# Patient Record
Sex: FEMALE | Race: WHITE
[De-identification: names, ages, dates, MRNs, and addresses within clinical notes are randomized per-mention and may not be internally consistent; named-entity substitution may affect disease eponyms.]

---

## 2017-08-18 ENCOUNTER — HOSPITAL ENCOUNTER (EMERGENCY)
Dept: HOSPITAL 25 - UCCORT | Age: 42
Discharge: HOME | End: 2017-08-18
Payer: COMMERCIAL

## 2017-08-18 VITALS — DIASTOLIC BLOOD PRESSURE: 69 MMHG | SYSTOLIC BLOOD PRESSURE: 127 MMHG

## 2017-08-18 DIAGNOSIS — Z88.1: ICD-10-CM

## 2017-08-18 DIAGNOSIS — Z88.5: ICD-10-CM

## 2017-08-18 DIAGNOSIS — M79.672: Primary | ICD-10-CM

## 2017-08-18 DIAGNOSIS — Z90.49: ICD-10-CM

## 2017-08-18 DIAGNOSIS — Z88.2: ICD-10-CM

## 2017-08-18 PROCEDURE — G0463 HOSPITAL OUTPT CLINIC VISIT: HCPCS

## 2017-08-18 PROCEDURE — 99212 OFFICE O/P EST SF 10 MIN: CPT

## 2017-08-18 NOTE — UC
Lower Extremity/Ankle HPI





- HPI Summary


HPI Summary: 





42 YEAR OLD FEMALE PRESENTS WITH COMPLAINS OF LEFT FOOT PAIN. 





- History of Current Complaint


Stated Complaint: LEFT FOOT PAIN


Time Seen by Provider: 17 19:36


Hx Obtained From: Patient


Hx Last Menstrual Period: 16


Severity Initially: Moderate


Severity Currently: Moderate


Pain Scale Used: 0-10 Numeric - 5


Aggravating Factor(s): Standing


Alleviating Factor(s): Rest, Elevation





- Allergies/Home Medications


Allergies/Adverse Reactions: 


 Allergies











Allergy/AdvReac Type Severity Reaction Status Date / Time


 


Sulfa Drugs Allergy Intermediate Hives Verified 17 19:52


 


Amoxicillin AdvReac Intermediate GI Upset Verified 17 19:52


 


Erythromycin AdvReac Intermediate GI Upset Verified 17 19:52


 


Codeine AdvReac  Shakes Verified 17 19:52














PMH/Surg Hx/FS Hx/Imm Hx





- Surgical History


Surgical History: Yes


Surgery Procedure, Year, and Place: gallbladder removed , tonsils .  D 

and C 2013 and 3/2013.  tubal ligation.   x1.  R carpel tunnel





- Family History


Known Family History: Positive: Cardiac Disease - father has had quadruple 

bypass, Hypertension, Other





- Social History


Alcohol Use: None


Substance Use Type: None


Smoking Status (MU): Never Smoked Tobacco





- Immunization History


Most Recent Influenza Vaccination: 2015





Review of Systems


Constitutional: Negative


Skin: Negative


Eyes: Negative


ENT: Negative


Respiratory: Negative


Cardiovascular: Negative


Gastrointestinal: Negative


Genitourinary: Negative


Motor: Negative


Neurovascular: Negative


Musculoskeletal: Other: - LEFT FOOT PAIN


Neurological: Negative


Psychological: Negative


All Other Systems Reviewed And Are Negative: Yes





Physical Exam


Triage Information Reviewed: Yes


Eye Exam: Normal


ENT Exam: Normal


Dental Exam: Normal


Neck exam: Normal


Neck: Positive: 1


Respiratory Exam: Normal


Cardiovascular Exam: Normal


Abdominal Exam: Normal


Musculoskeletal: Positive: Other: - LEFT FOOT PAIN


Neurological Exam: Normal


Psychological Exam: Normal


Skin Exam: Normal





Lower Extremity Course/Dx





- Differential Dx/Diagnosis


Provider Diagnoses: LEFT FOOT PAIN





Discharge





- Discharge Plan


Condition: Stable


Disposition: HOME


Prescriptions: 


Meloxicam [Mobic] 7.5 mg PO BID #30 tab


Patient Education Materials:  Foot Sprain (ED)


Referrals: 


Bubba Russell MD [Medical Doctor] - 


DIANE Zepeda PA [Primary Care Provider] - If Needed

## 2017-08-18 NOTE — RAD
INDICATION:  Atraumatic foot left pain.



TECHNIQUE: 3 views of the left foot were obtained.



FINDINGS: There is soft tissue swelling noted along the dorsal aspect of the foot. The

bones are normal alignment. No fracture is seen.  



Joint spaces appear maintained.



There are small calcaneal spurs.

  

IMPRESSION:  SOFT TISSUE SWELLING, NO FRACTURE IS SEEN.

## 2017-10-01 ENCOUNTER — HOSPITAL ENCOUNTER (EMERGENCY)
Dept: HOSPITAL 25 - UCCORT | Age: 42
Discharge: HOME | End: 2017-10-01
Payer: COMMERCIAL

## 2017-10-01 VITALS — SYSTOLIC BLOOD PRESSURE: 113 MMHG | DIASTOLIC BLOOD PRESSURE: 72 MMHG

## 2017-10-01 DIAGNOSIS — M25.561: Primary | ICD-10-CM

## 2017-10-01 PROCEDURE — 99211 OFF/OP EST MAY X REQ PHY/QHP: CPT

## 2017-10-01 PROCEDURE — G0463 HOSPITAL OUTPT CLINIC VISIT: HCPCS

## 2017-10-01 NOTE — RAD
HISTORY: Right knee pain



COMPARISONS: None



VIEWS: 4, Frontal, lateral, axial, and oblique views of the right knee



FINDINGS:



BONE DENSITY: Normal.

BONES: There is no displaced fracture. There is inferior patellar enthesophyte.

JOINTS: There is no arthropathy.    

ALIGNMENT: There is no dislocation. 

SOFT TISSUES: Unremarkable.



OTHER FINDINGS: None.



IMPRESSION: 

NO ACUTE OSSEOUS INJURY. IF SYMPTOMS PERSIST, RECOMMEND REPEAT IMAGING.

## 2017-10-01 NOTE — UC
Knee Pain HPI





- HPI Summary


HPI Summary: 





43 yo female with right knee pain x 1 week


no injury


pain worse with doing stairs ...has to take it one at a time


no swelling


no buckling


hard to get in and out of car


no locking up











- History of Current Complaint


Chief Complaint: UCLowerExtremity


Stated Complaint: RIGHT KNEE INJURY


Time Seen by Provider: 10/01/17 07:53


Hx Obtained From: Patient


Hx Last Menstrual Period: 17


Onset/Duration: Gradual Onset, Lasting Weeks - 1


Severity Initially: Moderate


Severity Currently: Moderate


Pain Intensity: 4


Pain Scale Used: 0-10 Numeric


Character: Dull, Aching


Aggravating Factor(s): Movement, Weight Bearing


Alleviating Factor(s): Rest


Associated Signs And Symptoms: Positive: Negative


Able to Bear Weight: Yes





- Allergies/Home Medications


Allergies/Adverse Reactions: 


 Allergies











Allergy/AdvReac Type Severity Reaction Status Date / Time


 


Sulfa Drugs Allergy Intermediate Hives Verified 10/01/17 07:53


 


Amoxicillin AdvReac Intermediate GI Upset Verified 10/01/17 07:53


 


Erythromycin AdvReac Intermediate GI Upset Verified 10/01/17 07:53


 


Codeine AdvReac  Shakes Verified 10/01/17 07:53














PMH/Surg Hx/FS Hx/Imm Hx


Previously Healthy: Yes





- Surgical History


Surgical History: Yes


Surgery Procedure, Year, and Place: gallbladder removed , tonsils .  D 

and C 2013 and 3/2013.  tubal ligation.   x1.  R carpel tunnel





- Family History


Known Family History: Positive: Cardiac Disease - father has had quadruple 

bypass, Hypertension, Other - RA





- Social History


Alcohol Use: None


Substance Use Type: None


Smoking Status (MU): Never Smoked Tobacco





- Immunization History


Most Recent Influenza Vaccination: 2017





Review of Systems


Constitutional: Negative


Skin: Negative


Eyes: Negative


ENT: Negative


Respiratory: Negative


Cardiovascular: Negative


Gastrointestinal: Negative


Genitourinary: Negative


Motor: Negative


Neurovascular: Negative


Musculoskeletal: Arthralgia


Neurological: Negative


Psychological: Negative


Is Patient Immunocompromised?: No


All Other Systems Reviewed And Are Negative: Yes





Physical Exam


Triage Information Reviewed: Yes


Appearance: Well-Appearing, No Pain Distress, Well-Nourished


Vital Signs: 


 Initial Vital Signs











Temp  98.5 F   10/01/17 07:49


 


Pulse  77   10/01/17 07:49


 


Resp  14   10/01/17 07:49


 


BP  113/72   10/01/17 07:49


 


Pulse Ox  100   10/01/17 07:49











Vital Signs Reviewed: Yes


Eyes: Positive: Conjunctiva Clear


ENT: Positive: Hearing grossly normal, Pharynx normal.  Negative: Nasal 

congestion, Nasal drainage, Trismus, Muffled/hoarse voice


Neck: Positive: Supple, Nontender, No Lymphadenopathy


Respiratory: Positive: Lungs clear, Normal breath sounds, No respiratory 

distress, No accessory muscle use


Cardiovascular: Positive: RRR, No Murmur


Musculoskeletal: Positive: ROM Intact, No Edema, Other: - slight right patellar 

grind/slight medial and lateral joint line pain no effusion right knee


Neurological: Positive: Alert


Psychological Exam: Normal


Skin Exam: Normal





Knee Pain Course/Dx





- Differential Dx/Diagnosis


Provider Diagnoses: right knee pain.  ? tendonitis versus patello femoral pain 

syndrome vs other





Discharge





- Discharge Plan


Condition: Stable


Disposition: HOME


Patient Education Materials:  Knee Pain (ED)


Referrals: 


DIANE Zepeda PA [Primary Care Provider] - 4 Days


Additional Instructions: 


you xr showed a bone spur coming off the lower pole of you knee cap





? tendonitis vs patellofemoral pain syndrome vs other





take ibuprofen or mobic





ice twice daily








follow up with your provider this week

## 2018-04-06 ENCOUNTER — HOSPITAL ENCOUNTER (EMERGENCY)
Dept: HOSPITAL 25 - UCCORT | Age: 43
Discharge: HOME | End: 2018-04-06
Payer: COMMERCIAL

## 2018-04-06 VITALS — DIASTOLIC BLOOD PRESSURE: 78 MMHG | SYSTOLIC BLOOD PRESSURE: 125 MMHG

## 2018-04-06 DIAGNOSIS — J02.0: Primary | ICD-10-CM

## 2018-04-06 PROCEDURE — 99212 OFFICE O/P EST SF 10 MIN: CPT

## 2018-04-06 PROCEDURE — G0463 HOSPITAL OUTPT CLINIC VISIT: HCPCS

## 2018-04-06 PROCEDURE — 87651 STREP A DNA AMP PROBE: CPT

## 2018-04-06 NOTE — UC
Throat Pain/Nasal Lj HPI





- History of Current Complaint


Chief Complaint: UCGeneralIllness


Stated Complaint: SORE THROAT


Time Seen by Provider: 18 10:02


Hx Last Menstrual Period: 18


Pain Intensity: 8





- Allergies/Home Medications


Allergies/Adverse Reactions: 


 Allergies











Allergy/AdvReac Type Severity Reaction Status Date / Time


 


amoxicillin Allergy  GI Upset Verified 18 09:43


 


codeine Allergy  Shakes Verified 18 09:43


 


erythromycin base Allergy  GI Upset Verified 18 09:43


 


Sulfa (Sulfonamide Allergy  Hives Verified 18 09:43





Antibiotics)     














PMH/Surg Hx/FS Hx/Imm Hx





- Surgical History


Surgical History: Yes


Surgery Procedure, Year, and Place: gallbladder removed , tonsils .  D 

and C 2013 and 3/2013.  tubal ligation.   x1.  R carpel tunnel





- Family History


Known Family History: Positive: Cardiac Disease - father has had quadruple 

bypass, Hypertension, Other - RA





- Social History


Alcohol Use: None


Substance Use Type: None


Smoking Status (MU): Never Smoked Tobacco





- Immunization History


Most Recent Influenza Vaccination: 2017





Physical Exam


Vital Signs: 


 Initial Vital Signs











Temp  98.6 F   18 09:41


 


Pulse  82   18 09:41


 


Resp  16   18 09:41


 


BP  125/78   18 09:41


 


Pulse Ox  100   18 09:41














Discharge





- Sign-Out/Discharge


Documenting (check all that apply): Discharge





- Discharge Plan


Referrals: 


DIANE Zepeda PA [Primary Care Provider] -

## 2018-04-06 NOTE — UC
Throat Pain/Nasal Lj HPI





- HPI Summary


HPI Summary: 





Pt works at day care


Pt with sore throat x 2 days. no fever, chills  


No n/v/d


no drooling


painful swallowing


no analgesia taken


no rash





Pt's medications reviewed this visit





- History of Current Complaint


Chief Complaint: UCGeneralIllness


Stated Complaint: SORE THROAT


Time Seen by Provider: 18 10:02


Hx Obtained From: Patient


Hx Last Menstrual Period: 18


Pregnant?: No


Onset/Duration: Gradual Onset


Pain Intensity: 8


Pain Scale Used: 0-10 Numeric





- Allergies/Home Medications


Allergies/Adverse Reactions: 


 Allergies











Allergy/AdvReac Type Severity Reaction Status Date / Time


 


amoxicillin Allergy  GI Upset Verified 18 09:43


 


codeine Allergy  Shakes Verified 18 09:43


 


erythromycin base Allergy  GI Upset Verified 18 09:43


 


Sulfa (Sulfonamide Allergy  Hives Verified 18 09:43





Antibiotics)     














PMH/Surg Hx/FS Hx/Imm Hx


Previously Healthy: Yes





- Surgical History


Surgical History: Yes


Surgery Procedure, Year, and Place: gallbladder removed , tonsils .  D 

and C 2013 and 3/2013.  tubal ligation.   x1.  R carpel tunnel





- Family History


Known Family History: Positive: Cardiac Disease - father has had quadruple 

bypass, Hypertension, Other - RA





- Social History


Occupation: Employed Full-time


Lives: With Family


Alcohol Use: None


Substance Use Type: None


Smoking Status (MU): Never Smoked Tobacco





- Immunization History


Most Recent Influenza Vaccination: 2017





Review of Systems


Constitutional: Fatigue


ENT: Sore Throat


All Other Systems Reviewed And Are Negative: Yes





Physical Exam


Triage Information Reviewed: Yes


Appearance: Well-Appearing, No Pain Distress, Well-Nourished


Vital Signs: 


 Initial Vital Signs











Temp  98.6 F   18 09:41


 


Pulse  82   18 09:41


 


Resp  16   18 09:41


 


BP  125/78   18 09:41


 


Pulse Ox  100   18 09:41











Vital Signs Reviewed: Yes


Eye Exam: Normal


ENT: Positive: Hearing grossly normal, Pharyngeal erythema, Nasal congestion, 

TMs normal


Dental Exam: Normal


Neck exam: Normal


Neck: Positive: Supple, Nontender, No Lymphadenopathy


Respiratory Exam: Normal


Respiratory: Positive: Chest non-tender, Lungs clear, Normal breath sounds, No 

respiratory distress, No accessory muscle use


Cardiovascular Exam: Normal


Cardiovascular: Positive: RRR, No Murmur


Abdominal Exam: Normal


Abdomen Description: Positive: Nontender, No Organomegaly, Soft


Bowel Sounds: Positive: Present


Musculoskeletal Exam: Normal


Musculoskeletal: Positive: Strength Intact


Neurological Exam: Normal


Neurological: Positive: Alert


Psychological Exam: Normal


Skin Exam: Normal





Throat Pain/Nasal Course/Dx





- Course


Course Of Treatment: Pt with sore throat x 2 days.  + strep.  will trial 

lidocaine - it like, rx.  [email or web address].  gargle/spit.  Rx amox - changed to 

omnicef due to sensitivity.  return precaution





- Differential Dx/Diagnosis


Provider Diagnoses: strep pharyngitis





Discharge





- Sign-Out/Discharge


Documenting (check all that apply): Discharge





- Discharge Plan


Condition: Stable


Disposition: HOME


Prescriptions: 


Amoxicillin PO (*) [Amoxicillin 875 MG (*)] 875 mg PO BID #20 tab


Fluconazole [Diflucan 150 MG (NF)] 150 mg PO ONCE PRN #1 tab


 PRN Reason: vaginal yeast infection


Lidocaine 2% VISCOUS* 15 ml SWISH SWAL Q4HR PRN #150 ml


 PRN Reason: Sore Throat


Forms:  *Work Release


Referrals: 


DIANE Zepeda PA [Primary Care Provider] - 


Additional Instructions: 


- Okay to alternate ibuprofen (Advil, Motrin) and Tylenol every 3 hours for 

pain. Take with food. Do NOT take for more than 4-5 days


- Okay to gargle and spit every 4 hours as needed for pain. Okay to use 

lidocaine as prescribed


- Stay well hydrated - frequent sips of cold fluids will be soothing to your 

throat (popsicles, jello, ice cream, ice water). Avoid excess caffeine until 

your symptoms have resolved. 


- Do not share eating, drinking utensils. Throw out your toothbrush when your 

symptoms resolved


-Throat infections are spread by oral secretions - do not share eating or 

drinking utensils until you symptoms are resolved.  Clean items that may get 

your secretions such as cell phones, ipads, computer mouse, television remotes


- Take antibiotics as prescribed until gone


- you have been given a prescription for diflucan - okay to take if you develop 

a vaginal yeast infection from the antibiotics


- Contact your doctor to arrange a follow-up appointment as needed





- Billing Disposition and Condition


Condition: STABLE


Disposition: HOME